# Patient Record
Sex: FEMALE | Race: ASIAN | Employment: UNEMPLOYED | URBAN - METROPOLITAN AREA
[De-identification: names, ages, dates, MRNs, and addresses within clinical notes are randomized per-mention and may not be internally consistent; named-entity substitution may affect disease eponyms.]

---

## 2022-01-05 ENCOUNTER — PATIENT OUTREACH (OUTPATIENT)
Dept: FAMILY MEDICINE | Facility: CLINIC | Age: 57
End: 2022-01-05

## 2022-01-05 ENCOUNTER — OFFICE VISIT (OUTPATIENT)
Dept: FAMILY MEDICINE | Facility: CLINIC | Age: 57
End: 2022-01-05
Payer: MEDICAID

## 2022-01-05 VITALS
SYSTOLIC BLOOD PRESSURE: 125 MMHG | TEMPERATURE: 98.5 F | BODY MASS INDEX: 29.47 KG/M2 | HEIGHT: 56 IN | OXYGEN SATURATION: 94 % | RESPIRATION RATE: 20 BRPM | HEART RATE: 92 BPM | WEIGHT: 131 LBS | DIASTOLIC BLOOD PRESSURE: 85 MMHG

## 2022-01-05 DIAGNOSIS — Z59.811 HOUSING INSTABILITY, CURRENTLY HOUSED, AT RISK FOR HOMELESSNESS: Primary | ICD-10-CM

## 2022-01-05 DIAGNOSIS — Z23 HIGH PRIORITY FOR 2019-NCOV VACCINE: ICD-10-CM

## 2022-01-05 DIAGNOSIS — R45.851 SUICIDAL THOUGHTS: ICD-10-CM

## 2022-01-05 DIAGNOSIS — Z86.73 HISTORY OF CVA (CEREBROVASCULAR ACCIDENT): ICD-10-CM

## 2022-01-05 PROCEDURE — 91300 COVID-19,PF,PFIZER (12+ YRS): CPT | Performed by: MASSAGE THERAPIST

## 2022-01-05 PROCEDURE — 99204 OFFICE O/P NEW MOD 45 MIN: CPT | Mod: 25 | Performed by: MASSAGE THERAPIST

## 2022-01-05 PROCEDURE — 0004A COVID-19,PF,PFIZER (12+ YRS): CPT | Performed by: MASSAGE THERAPIST

## 2022-01-05 PROCEDURE — 96127 BRIEF EMOTIONAL/BEHAV ASSMT: CPT | Performed by: MASSAGE THERAPIST

## 2022-01-05 SDOH — ECONOMIC STABILITY - HOUSING INSECURITY: HOUSING INSTABILITY WITH RISK OF HOMELESSNESS: Z59.811

## 2022-01-05 ASSESSMENT — MIFFLIN-ST. JEOR: SCORE: 1050.72

## 2022-01-05 NOTE — PROGRESS NOTES
Preceptor Attestation:  Patient's case reviewed and discussed with Peter Bonner MD resident and I evaluated the patient. I agree with written assessment and plan of care.  Supervising Physician:  Joaquim Spears MD, MD [facility]  [facility]

## 2022-01-05 NOTE — PROGRESS NOTES
Clinic Care Coordination Contact    Follow Up Progress Note      Assessment: SW met with patient this date. Patient presented teary-eyed throughout session this date. Patient reported significant stress and grief due to patient's 10 children being told by recently remarried ex-partner that patient is no longer their mother and that they should have patient move out of their home. Patient reported her oldest children has her youngest child and patient has not been able to be in contact with youngest child. Patient also reported daily passive suicidal ideation but reported no current or historical plans or intent and no history of self-injurious behavior. Patient vocalized wanting to meet with therapist at [facility] if possible and to work with SW to schedule therapy and determine possible legal resources regarding youngest child. Patient vocalized agreement to work with SW's peer Souk to resolve Social Security issues.    Care Gaps:    Health Maintenance Due   Topic Date Due     PREVENTIVE CARE VISIT  Never done     ADVANCE CARE PLANNING  Never done     MAMMO SCREENING  Never done     COLORECTAL CANCER SCREENING  Never done     HIV SCREENING  Never done     HEPATITIS C SCREENING  Never done     PAP  Never done     DTAP/TDAP/TD IMMUNIZATION (1 - Tdap) Never done     LIPID  Never done     ZOSTER IMMUNIZATION (1 of 2) Never done     INFLUENZA VACCINE (1) Never done       Goals addressed this encounter:   Goals Addressed                    This Visit's Progress       Mental Health Management (pt-stated)         Goal Statement: I will manage my mental health by getting scheduled for therapy.   Date Goal set: 1/5/22   Barriers: Patient speaks English as second language and mostly through .  Strengths: Patient reported wanting therapy.  Date to Achieve By: 1/19/22  Patient expressed understanding of goal: Yes  Action steps to achieve this goal:  1. I will follow-up with ROSA MARIA by phone regarding therapy options.  2. I  will work to schedule a therapist with SW.  3. I will attend first therapy appointment.              Intervention/Education provided during outreach: Patient educated on importance of self-monitoring for suicidal ideation and talk-therapy for managing emotional distress.    Plan:   Patient to remain in contact with SW to schedule therapy appointment.    Patient to follow-up with Souk regarding Social Security benefits.    Care Coordinator will follow up in 1-2 business days.    MIGDALIA PARRA, AWAIS, ZARAC

## 2022-01-05 NOTE — PATIENT INSTRUCTIONS
Patient to remain in contact with  to schedule therapy appointment.    Patient to follow-up with Maria Dolores regarding Social Security benefits.

## 2022-01-05 NOTE — NURSING NOTE
Due to patient being non-English speaking/uses sign language, an  was used for this visit. Only for face-to-face interpretation by an external agency, date and length of interpretation can be found on the scanned worksheet.     name: desire  Agency: [employer]  Language: Cali   Telephone number: [phone]  Type of interpretation: Telephone, spoken

## 2022-01-05 NOTE — PROGRESS NOTES
Clinic Care Coordination Contact    Follow Up Progress Note      Assessment:  wanted me to talk to the pt about some [facility] resources and about her Social Security benefits. I talked to the pt, pt is currently getting [place], and [place] though the state. The pt stated that her social security benefits had stopped. I told the pt that I will call the Social Security office to find out why. They informed me that they stopped it, because they could not get a hold of the pt. They did not have a current address. The payment that they sent out, kept being returned, so they stopped it till they can reach the pt. They were able to update the pt address, and phone number. The pt has a young daughter that she is getting money from Social Security,but the pt is the payee for daughter. This payment will stop if the daughter is not living with the pt anymore. They will give the payment to the pt for now, until they can find out further more about who or where the pt daughter will be living with. I told the pt what the Social Security told me. Pt will wait to see if she gets her payment in the mail.     The pt is currently receiving services from the [facility]. The [facility] is having the pt be actively looking for a job. Pt is currently does not have any transportation, or is capable of getting a job. She will need the Medical Opinion form from the [facility] filled out by her doctor to state why she is not capable of working. I will put this form in  mail box to see if she can fill it out.     Care Gaps:    Health Maintenance Due   Topic Date Due     PREVENTIVE CARE VISIT  Never done     ADVANCE CARE PLANNING  Never done     MAMMO SCREENING  Never done     COLORECTAL CANCER SCREENING  Never done     HIV SCREENING  Never done     HEPATITIS C SCREENING  Never done     PAP  Never done     DTAP/TDAP/TD IMMUNIZATION (1 - Tdap) Never done     LIPID  Never done     ZOSTER IMMUNIZATION (1 of 2) Never done     INFLUENZA VACCINE (1)  Never done       Currently there are no Care Gaps.    Goals addressed this encounter:   Goals Addressed    None         Intervention/Education provided during outreach: NA          Plan:     Care Coordinator will follow up in two weeks

## 2022-01-05 NOTE — PROGRESS NOTES
Assessment & Plan     Suicidal thoughts  Reports feeling down and depressed almost every day.  She does have thoughts of killing herself, but specifies that she does not have a particular mechanism or time planned.  She does not think she would actually do it.  Is open to therapy and so will place a MH referral today.   - Care Coordination Referral  - Adult Mental Health Referral    Housing instability, currently housed, at risk for homelessness  Her kids are not allowing her to live with them. Currently living at a friends house, feels safe there.  Has concerns about her social security payments.   - Care Coordination Referral    History of CVA (cerebrovascular accident)  Patient not sure if she had a hemorrhagic or ischemic stroke.  Does not appear to be on any prescription medications for prophylaxis.  She will return to clinic for a follow up in 1 week and hopefully at that time we will have her outside medical records and can re-evaluate if she should be on any prophylactic medications.     Health Maintenance   Given her urgent social/mental health concerns (and lack of outside records) today did not address health maintenance. She will schedule a follow up appointment in 1 week and we can discuss necessary screening tests at that time    High priority for 2019-nCoV vaccine  - COVID-19,PF,PFIZER (12+ Yrs PURPLE LABEL)      Depression Screening Follow Up    PHQ 1/5/2022   Q9: Thoughts of better off dead/self-harm past 2 weeks Several days     Last PHQ-9 1/5/2022   1.  Little interest or pleasure in doing things 3   2.  Feeling down, depressed, or hopeless 3   3.  Trouble falling or staying asleep, or sleeping too much 3   4.  Feeling tired or having little energy 3   5.  Poor appetite or overeating -   6.  Feeling bad about yourself 3   7.  Trouble concentrating 1   8.  Moving slowly or restless 3   Q9: Thoughts of better off dead/self-harm past 2 weeks 1   Difficulty at work, home, or with people Somewhat  "difficult       Follow Up      Follow Up Actions Taken  Patient to follow up with PCP.  Clinic staff to schedule appointment if able.  Mental Health Referral placed      Peter Bonner MD  [facility]    Precepted with Dr. Regan Cook   Ramiro is a 56 year old who presents for the following health issues:     HPI     Financial/Housing instability   Her primary concern today is that her social security payments got decreased  Thinks she may have lost a renewal form when moving     Family concerns  1st marriage-  11 kids.  Ended in divorce when the youngest was 8 months old.   2nd marriage- 1 kid.   8 years ago.   1st ex- recently got remarried.  Told her surviving children not to \"love her\" anymore and that she was \"no longer their mother.\"   She had one son who  in 2019.  After that she had one experience where she \"saw\" her dead son and started speaking in a different language.  After this her kids think she is \"crazy\" and her oldest child took her youngest child away from her and is currently caring for the youngest child.   None of her children are allowing her to live with them.  Living with friends temporarily     Hx. Stroke  1 year ago   Some residual weakness in right leg and decreased sensation in right toes   Getting better with an herbal medication   Does not remember what kind of stroke.     Mood   Sad because kids dont want her to live with them   Thinks about killing herself every day   No specific plans   No previous attempts, no previous self harm behaviors               Objective    /85   Pulse 92   Temp 98.5  F (36.9  C) (Oral)   Resp 20   Ht 1.42 m (4' 7.91\")   Wt 59.4 kg (131 lb)   SpO2 94%   BMI 29.47 kg/m    Body mass index is 29.47 kg/m .  Physical Exam   GENERAL: alert, tearful   RESP: normal work of breathing, no cough   CV: extremities well perfused   MS: no gross musculoskeletal defects noted, no edema  Neuro: moves " all 4 extremities, 4/5  strength on right, 4/5 right hip flexion and dorsiflexion.   Psych:tearful, endorses suicidal thoughts but no plans or attempts, not making eye contact      ----- Service Performed and Documented by Resident or Fellow ------

## 2022-01-12 ENCOUNTER — PATIENT OUTREACH (OUTPATIENT)
Dept: FAMILY MEDICINE | Facility: CLINIC | Age: 57
End: 2022-01-12
Payer: MEDICAID

## 2022-01-12 NOTE — PROGRESS NOTES
ROSA MARIA called patient with  this date and inquired to patient's comfort meeting with male therapist. Patient reported being comfortable meeting with male therapist. Patient scheduled to meet with Dr. Ayala at [facility] 1/21/22 at 9am. Patient reported having own transportation to clinic. SW informed patient that clinic will schedule  for patient.  Patient reported understanding the referral and appointment this date.    MIGDALIA PARRA, AWAIS, LADC

## 2022-01-21 ENCOUNTER — OFFICE VISIT (OUTPATIENT)
Dept: PSYCHOLOGY | Facility: CLINIC | Age: 57
End: 2022-01-21
Payer: MEDICAID

## 2022-01-21 DIAGNOSIS — F41.1 GAD (GENERALIZED ANXIETY DISORDER): ICD-10-CM

## 2022-01-21 DIAGNOSIS — F33.1 DEPRESSION, MAJOR, RECURRENT, MODERATE (H): Primary | ICD-10-CM

## 2022-01-21 PROCEDURE — 90791 PSYCH DIAGNOSTIC EVALUATION: CPT | Performed by: MARRIAGE & FAMILY THERAPIST

## 2022-01-21 PROCEDURE — 90785 PSYTX COMPLEX INTERACTIVE: CPT | Performed by: MARRIAGE & FAMILY THERAPIST
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
## 2022-02-14 ENCOUNTER — APPOINTMENT (OUTPATIENT)
Dept: INTERPRETER SERVICES | Facility: CLINIC | Age: 57
End: 2022-02-14
Payer: MEDICAID

## 2022-04-27 PROBLEM — Z59.819 HOUSING INSTABILITY: Status: ACTIVE | Noted: 2022-04-27

## 2022-04-27 PROBLEM — F32.9 MAJOR DEPRESSIVE DISORDER WITH CURRENT ACTIVE EPISODE: Status: ACTIVE | Noted: 2022-04-27

## 2022-08-23 ENCOUNTER — PATIENT OUTREACH (OUTPATIENT)
Dept: CARE COORDINATION | Facility: CLINIC | Age: 57
End: 2022-08-23

## 2022-08-23 NOTE — PROGRESS NOTES
Clinic Care Coordination - Chart Review Only    Situation/Background: Patient chart reviewed by care coordinator related to [facility] conversion.    Assessment: Patient no longer  followed by Clinic Care Coordination.    Plan: Patient's chart updated to align with [facility]